# Patient Record
Sex: FEMALE | Race: WHITE | NOT HISPANIC OR LATINO | Employment: STUDENT | URBAN - METROPOLITAN AREA
[De-identification: names, ages, dates, MRNs, and addresses within clinical notes are randomized per-mention and may not be internally consistent; named-entity substitution may affect disease eponyms.]

---

## 2017-08-07 ENCOUNTER — OFFICE VISIT (OUTPATIENT)
Dept: FAMILY MEDICINE CLINIC | Facility: CLINIC | Age: 22
End: 2017-08-07

## 2017-08-07 VITALS
DIASTOLIC BLOOD PRESSURE: 74 MMHG | BODY MASS INDEX: 32.49 KG/M2 | TEMPERATURE: 97.9 F | SYSTOLIC BLOOD PRESSURE: 118 MMHG | HEIGHT: 67 IN | OXYGEN SATURATION: 99 % | WEIGHT: 207 LBS | HEART RATE: 80 BPM

## 2017-08-07 DIAGNOSIS — J35.8 TONSIL STONE: ICD-10-CM

## 2017-08-07 DIAGNOSIS — Z30.41 ENCOUNTER FOR SURVEILLANCE OF CONTRACEPTIVE PILLS: Primary | ICD-10-CM

## 2017-08-07 DIAGNOSIS — E73.9 LACTOSE INTOLERANCE: ICD-10-CM

## 2017-08-07 PROCEDURE — 99203 OFFICE O/P NEW LOW 30 MIN: CPT | Performed by: NURSE PRACTITIONER

## 2017-08-07 RX ORDER — NORGESTIMATE AND ETHINYL ESTRADIOL 0.25-0.035
1 KIT ORAL DAILY
Qty: 28 TABLET | Refills: 11 | Status: SHIPPED | OUTPATIENT
Start: 2017-08-07

## 2017-08-07 RX ORDER — NORGESTIMATE AND ETHINYL ESTRADIOL 0.25-0.035
1 KIT ORAL DAILY
COMMUNITY
End: 2017-08-07 | Stop reason: SDUPTHER

## 2017-08-07 NOTE — PROGRESS NOTES
"Subjective   Le Vyas is a 21 y.o. female who presents to [facility] care as a new patient. Has been going to clinic at [place] for birth control but needs scripts renewed.     History of Present Illness   Likes birth control for control of irregular periods. Not currently sexually active. 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 On current form of birth control about 15 months. No others tried. No FH clotting disorders or DVT, PE, no history of migraines.     Has stomach sensitivity and diarrhea with certain foods. Does not attribute to stress. No vomiting or nausea, can be triggered by virgin pina colada, starbucks with milk, pizza.     History of frequent strep, worried as gets tonsil stones, no history of kidney stones or gall stones. Typically gets strep 1-2 x yr. Stones cause bad breath.     The following portions of the patient's history were reviewed and updated as appropriate: allergies, current medications, past family history, past medical history, past social history, past surgical history and problem list.    Review of Systems   Constitutional: Negative.  Negative for chills and fever.   HENT: Positive for sore throat.         Bad breath with stones   Gastrointestinal: Positive for diarrhea. Negative for constipation, nausea and vomiting.   Genitourinary: Negative.    Skin: Negative.    Psychiatric/Behavioral: Negative.    All other systems reviewed and are negative.    /74  Pulse 80  Temp 97.9 °F (36.6 °C) (Oral)   Ht 67\" (170.2 cm)  Wt 207 lb (93.9 kg)  LMP 07/31/2017 (Approximate)  SpO2 99%  BMI 32.42 kg/m2    Objective   Physical Exam   Constitutional: She appears well-developed and well-nourished.   HENT:   Mouth/Throat: Tonsils are 2+ on the right. Tonsils are 2+ on the left. No tonsillar exudate.   2+ tonsils, L with stone 1-2mm   Cardiovascular: Normal rate and regular rhythm.    Pulmonary/Chest: Effort normal and breath sounds normal.   Abdominal: Soft. Bowel sounds " are normal. She exhibits no distension and no mass. There is no tenderness. There is no rebound and no guarding. No hernia.   Skin: Skin is warm and dry.   Psychiatric: She has a normal mood and affect. Her behavior is normal. Judgment and thought content normal.   Nursing note and vitals reviewed.    Assessment/Plan   Problems Addressed this Visit     None      Visit Diagnoses     Encounter for surveillance of contraceptive pills    -  Primary    Tonsil stone        Lactose intolerance            If more frequent strep would refer to ENT for evaluation for tonsil removal. Otherwise can leave alone.     Reasonable for continuation of birth control. Start STD testing once sexually active annually. Use back up protection always (condoms). Start paps at 23, sooner if problems.     Suspect some lactose intolerance, try lactaid with any dairy, Follow up if not settling.